# Patient Record
Sex: FEMALE | Race: WHITE | Employment: FULL TIME | URBAN - METROPOLITAN AREA
[De-identification: names, ages, dates, MRNs, and addresses within clinical notes are randomized per-mention and may not be internally consistent; named-entity substitution may affect disease eponyms.]

---

## 2017-02-10 ENCOUNTER — OFFICE VISIT (OUTPATIENT)
Dept: FAMILY MEDICINE | Facility: CLINIC | Age: 57
End: 2017-02-10
Payer: COMMERCIAL

## 2017-02-10 VITALS
DIASTOLIC BLOOD PRESSURE: 70 MMHG | RESPIRATION RATE: 16 BRPM | HEART RATE: 70 BPM | SYSTOLIC BLOOD PRESSURE: 120 MMHG | TEMPERATURE: 98.9 F

## 2017-02-10 DIAGNOSIS — M70.61 TROCHANTERIC BURSITIS OF BOTH HIPS: Primary | ICD-10-CM

## 2017-02-10 DIAGNOSIS — M70.62 TROCHANTERIC BURSITIS OF BOTH HIPS: Primary | ICD-10-CM

## 2017-02-10 DIAGNOSIS — M25.532 LEFT WRIST PAIN: ICD-10-CM

## 2017-02-10 DIAGNOSIS — L30.9 ECZEMA, UNSPECIFIED TYPE: ICD-10-CM

## 2017-02-10 PROCEDURE — 99214 OFFICE O/P EST MOD 30 MIN: CPT | Mod: 25 | Performed by: FAMILY MEDICINE

## 2017-02-10 PROCEDURE — 20605 DRAIN/INJ JOINT/BURSA W/O US: CPT | Mod: RT | Performed by: FAMILY MEDICINE

## 2017-02-10 RX ORDER — TRIAMCINOLONE ACETONIDE 40 MG/ML
40 INJECTION, SUSPENSION INTRA-ARTICULAR; INTRAMUSCULAR ONCE
Qty: 1 ML | Refills: 0 | OUTPATIENT
Start: 2017-02-10 | End: 2017-02-10

## 2017-02-10 RX ORDER — TRIAMCINOLONE ACETONIDE 1 MG/G
CREAM TOPICAL
Qty: 80 G | Refills: 0 | Status: SHIPPED | OUTPATIENT
Start: 2017-02-10 | End: 2018-06-22

## 2017-02-10 NOTE — NURSING NOTE
"Chief Complaint   Patient presents with     Musculoskeletal Problem     Derm Problem     /70 mmHg  Pulse 70  Temp(Src) 98.9  F (37.2  C) (Tympanic)  Resp 16  Wt  Estimated body mass index is 29.32 kg/(m^2) as calculated from the following:    Height as of 11/3/16: 5' 2.5\" (1.588 m).    Weight as of 4/22/14: 163 lb (73.936 kg).  bp completed using cuff size: large      Health Maintenance that is potentially due pending provider review:          "

## 2017-02-10 NOTE — MR AVS SNAPSHOT
After Visit Summary   2/10/2017    Bella Fuentes    MRN: [id number]           Patient Information     Date Of Birth          1960        Visit Information        Provider Department      2/10/2017 10:20 AM Umu Albright MD [facility]        Today's Diagnoses     Trochanteric bursitis of both hips    -  1     Eczema, unspecified type         Left wrist pain            Follow-ups after your visit        Who to contact     If you have questions or need follow up information about today's clinic visit or your schedule please contact [facility] directly at [phone].  Normal or non-critical lab and imaging results will be communicated to you by Twitterhart, letter or phone within 4 business days after the clinic has received the results. If you do not hear from us within 7 days, please contact the clinic through Twitterhart or phone. If you have a critical or abnormal lab result, we will notify you by phone as soon as possible.  Submit refill requests through [email or web address] or call your pharmacy and they will forward the refill request to us. Please allow 3 business days for your refill to be completed.          Additional Information About Your Visit        MyChart Information     [email or web address] gives you secure access to your electronic health record. If you see a primary care provider, you can also send messages to your care team and make appointments. If you have questions, please call your primary care clinic.  If you do not have a primary care provider, please call [phone] and they will assist you.        Care EveryWhere ID     This is your Care EveryWhere ID. This could be used by other organizations to access your [facility] medical records  [id number]        Your Vitals Were     Pulse Temperature Respirations             70 98.9  F (37.2  C) (Tympanic) 16          Blood Pressure from Last 3 Encounters:   02/10/17 120/70   11/03/16 145/79   10/29/16 134/83    Weight  from Last 3 Encounters:   04/22/14 163 lb (73.936 kg)   04/02/14 163 lb (73.936 kg)   05/13/11 195 lb (88.451 kg)              We Performed the Following     DRAIN/INJECT MEDIUM JOINT/BURSA     Kenalog 40 MG  []          Today's Medication Changes          These changes are accurate as of: 2/10/17 11:08 AM.  If you have any questions, ask your nurse or doctor.               Start taking these medicines.        Dose/Directions    triamcinolone 0.1 % cream   Commonly known as:  KENALOG   Used for:  Eczema, unspecified type   Started by:  Umu Albright MD        Apply sparingly to affected area three times daily as needed   Quantity:  80 g   Refills:  0       triamcinolone acetonide 40 MG/ML injection   Commonly known as:  KENALOG   Used for:  Trochanteric bursitis of both hips   Started by:  Umu Albrigth MD        Dose:  40 mg   1 mL (40 mg) by INTRA-ARTICULAR route once for 1 dose   Quantity:  1 mL   Refills:  0            Where to get your medicines      These medications were sent to [place] [address] [place] [facility] [place] [id number]     Phone:  [phone]    - triamcinolone 0.1 % cream      Some of these will need a paper prescription and others can be bought over the counter.  Ask your nurse if you have questions.     You don't need a prescription for these medications    - triamcinolone acetonide 40 MG/ML injection             Primary Care Provider Office Phone # Fax #    Umu Albright [phone]       [address] [address]        Thank you!     Thank you for choosing [facility]  for your care. Our goal is always to provide you with excellent care. Hearing back from our patients is one way we can continue to improve our services. Please take a few minutes to complete the written survey that you may receive in the mail after your visit with us. Thank you!             Your  Updated Medication List - Protect others around you: Learn how to safely use, store and throw away your medicines at www.disposemymeds.org.          This list is accurate as of: 2/10/17 11:08 AM.  Always use your most recent med list.                   Brand Name Dispense Instructions for use    EPINEPHrine 0.3 MG/0.3ML injection    EPIPEN 2-TRISTAN    1 each    Inject 0.3 mLs (0.3 mg) into the muscle once as needed for anaphylaxis       LORazepam 0.5 MG tablet    ATIVAN    20 tablet    Take 1 tablet (0.5 mg) by mouth every 8 hours as needed for anxiety       triamcinolone 0.1 % cream    KENALOG    80 g    Apply sparingly to affected area three times daily as needed       triamcinolone acetonide 40 MG/ML injection    KENALOG    1 mL    1 mL (40 mg) by INTRA-ARTICULAR route once for 1 dose

## 2017-02-10 NOTE — PROGRESS NOTES
"  SUBJECTIVE:                                                    Bella Fuentes is a 57 year old female who presents to clinic today for the following health issues:       Bilateral hip pain      Duration:  Several months    Description (location/character/radiation): bilateral month pain    Intensity:  mild, moderate    Accompanying signs and symptoms: pain, hard to sleep, left worse than right    History (similar episodes/previous evaluation): yes    Precipitating or alleviating factors: None    Therapies tried and outcome: Kenalog helped         Derm      Duration: months ??    Description (location/character/radiation): hand and feet rash    Intensity:  mild    Accompanying signs and symptoms: some bumps and peeling of skin    History (similar episodes/previous evaluation): None    Precipitating or alleviating factors: None    Therapies tried and outcome: Lotion and it's getting better.     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  (doesn't get the pain if she hyperextends the right thumb).   Pain is \"deep\"         Problem list and histories reviewed & adjusted, as indicated.  Additional history: as documented    /70 mmHg  Pulse 70  Temp(Src) 98.9  F (37.2  C) (Tympanic)  Resp 16  Wt   EXAM: GENERAL APPEARANCE: Alert, no acute distress  MS: wrist exam normal wrist appearance and range of motion, normal range of motion, Tinel's test negative  hip exam normal appearance, tenderness over " greater trochanter, range of motion normal  Skin: bilateral palms with peeling skin and scaling c/w eczema      Risks, benefits and alternatives discussed   A steroid injection was performed at right greater trochanter using 1% plain Lidocaine and 50 mg of Kenalog. This was well tolerated.    ASSESSMENT/PLAN:      ICD-10-CM    1. Trochanteric bursitis of both hips M70.61 Kenalog 40 MG  []    M70.62 triamcinolone acetonide (KENALOG) 40 MG/ML injection     DRAIN/INJECT MEDIUM JOINT/BURSA   2. Eczema, unspecified type L30.9 triamcinolone (KENALOG) 0.1 % cream   3. Left wrist pain M25.532         Offered xray of the wrist today, patient declined.       Discussed risks and benefits and alternatives to topical steroids.  Avoid prolonged use.  Discussed side effects such as thining of the skin and pigment changes if used for long periods of time.     Handout given on trochanteric bursitis.    Umu Albright M.D.

## 2017-03-28 ENCOUNTER — TELEPHONE (OUTPATIENT)
Dept: FAMILY MEDICINE | Facility: CLINIC | Age: 57
End: 2017-03-28

## 2017-03-28 DIAGNOSIS — M25.551 HIP PAIN, RIGHT: Primary | ICD-10-CM

## 2017-03-28 RX ORDER — DICLOFENAC SODIUM 75 MG/1
75 TABLET, DELAYED RELEASE ORAL 2 TIMES DAILY PRN
Qty: 30 TABLET | Refills: 0 | Status: SHIPPED | OUTPATIENT
Start: 2017-03-28 | End: 2018-06-22

## 2017-03-28 NOTE — TELEPHONE ENCOUNTER
Pt was notified of provider's advise below.   Pt verbalized understanding and had no further questions at this time.   Encounter closed.   Irlanda JACBO RN

## 2017-03-28 NOTE — TELEPHONE ENCOUNTER
Reason for call:  Patient reporting a symptom    Symptom or request: Pt had her right hip injected 02/10 and it is still causing a lot of pain.  Pt wants an Rx sent to [facility] [place]  I did tell her that Dr. Albright is not in today and she asked that another provider address her request.  I did tell her that if she is looking for a controlled pain med, she would need to be seen in clinic by another provider.      Duration (how long have symptoms been present): ongoing    Have you been treated for this before? Yes    Additional comments:     Phone Number patient can be reached at:  Cell number on file:    Telephone Information:   Mobile [phone]     Best Time:  any    Can we leave a detailed message on this number:  YES    Call taken on 3/28/2017 at 9:11 AM by Emily Garcia

## 2017-03-28 NOTE — TELEPHONE ENCOUNTER
Office visit 2/10/17   Trochanteric bursitis of both hips M70.61 Kenalog 40 MG []      M70.62 triamcinolone acetonide (KENALOG) 40 MG/ML injection       DRAIN/INJECT MEDIUM JOINT/BURSA     Pt c/o:  Pain with ambulation or sitting: no  - just when she lays on her right side.   Drove from [place] in a car - sitting for 4-5 days. (Flew to [place] last Sunday (3/19/17) and then drove home Thursday (3/23/17)  morning 8 hours a day)   Numbness or tingling in leg or toes: no  Pain radiates: no   Pain rated at: 8 out of 10   Toes are not discolored at this time.   Fevers: no     Not using tylenol or ibuprofen, ice or hot packs.   Pt was educated on the use of leg elevation, ice packs 20-30 minutes every 3-4 hrs.     Please advise   Thank you.   Irlanda JACOB RN

## 2017-03-28 NOTE — TELEPHONE ENCOUNTER
I have an anti inflammatory pending for her, enter pharmacy please.  Continue with other recommended cares and can try the medication for pain.  Will need to be seen if any further concerns.  Naima Pettit, KALEENP

## 2017-04-22 ENCOUNTER — TELEPHONE (OUTPATIENT)
Dept: FAMILY MEDICINE | Facility: CLINIC | Age: 57
End: 2017-04-22

## 2017-04-22 DIAGNOSIS — T78.40XA ALLERGIC REACTION: Primary | ICD-10-CM

## 2017-04-22 NOTE — TELEPHONE ENCOUNTER
Patient called today.    Patient is requesting medication for generic epinephrine ASAP.    Would like it sent to [facility].    Please contact patient.    Thank you.    Central Scheduling  Libby CARRASCO.

## 2017-04-24 RX ORDER — EPINEPHRINE 0.3 MG/.3ML
0.3 INJECTION SUBCUTANEOUS PRN
Qty: 0.6 ML | Refills: 1 | Status: SHIPPED | OUTPATIENT
Start: 2017-04-24 | End: 2018-05-05

## 2017-04-24 NOTE — TELEPHONE ENCOUNTER
Pt would like a generic brand refill for her epi-pen  Pen was last refilled in 2014   Pt states that she has not had to use a pen in a emergency situation for many years but would not like to be without it.   She states she uses the pen for bee stings.     EPINEPHrine (EPIPEN 2-TRISTAN) 0.3MG/0.3ML injection      Last Written Prescription Date: 5/2014  Last Fill Quantity: 1,  # refills: 1   Last Office Visit with [facility], P or [facility] prescribing provider: 2/10/2017            Script was sent, writer was advised by [facility] pharmacy on how to order.   Pt was advised.  Encounter closed.   Irlanda JACOB RN

## 2017-04-28 ENCOUNTER — TELEPHONE (OUTPATIENT)
Dept: NURSING | Facility: CLINIC | Age: 57
End: 2017-04-28

## 2017-04-28 ENCOUNTER — APPOINTMENT (OUTPATIENT)
Dept: GENERAL RADIOLOGY | Facility: CLINIC | Age: 57
End: 2017-04-28
Attending: NURSE PRACTITIONER
Payer: COMMERCIAL

## 2017-04-28 ENCOUNTER — HOSPITAL ENCOUNTER (EMERGENCY)
Facility: CLINIC | Age: 57
Discharge: HOME | End: 2017-04-28
Attending: NURSE PRACTITIONER | Admitting: NURSE PRACTITIONER
Payer: COMMERCIAL

## 2017-04-28 VITALS
DIASTOLIC BLOOD PRESSURE: 87 MMHG | OXYGEN SATURATION: 99 % | SYSTOLIC BLOOD PRESSURE: 144 MMHG | RESPIRATION RATE: 16 BRPM | TEMPERATURE: 97.7 F

## 2017-04-28 DIAGNOSIS — R06.02 SHORTNESS OF BREATH: ICD-10-CM

## 2017-04-28 LAB
ANION GAP SERPL CALCULATED.3IONS-SCNC: 8 MMOL/L (ref 3–14)
BASOPHILS # BLD AUTO: 0.1 10E9/L (ref 0–0.2)
BASOPHILS NFR BLD AUTO: 0.6 %
BUN SERPL-MCNC: 20 MG/DL (ref 7–30)
CALCIUM SERPL-MCNC: 9.1 MG/DL (ref 8.5–10.1)
CHLORIDE SERPL-SCNC: 106 MMOL/L (ref 94–109)
CO2 SERPL-SCNC: 24 MMOL/L (ref 20–32)
CREAT SERPL-MCNC: 0.68 MG/DL (ref 0.52–1.04)
DIFFERENTIAL METHOD BLD: ABNORMAL
EOSINOPHIL # BLD AUTO: 0.2 10E9/L (ref 0–0.7)
EOSINOPHIL NFR BLD AUTO: 2 %
ERYTHROCYTE [DISTWIDTH] IN BLOOD BY AUTOMATED COUNT: 14.2 % (ref 10–15)
GFR SERPL CREATININE-BSD FRML MDRD: 88 ML/MIN/1.7M2
GLUCOSE SERPL-MCNC: 89 MG/DL (ref 70–99)
HCT VFR BLD AUTO: 42.7 % (ref 35–47)
HGB BLD-MCNC: 14.2 G/DL (ref 11.7–15.7)
IMM GRANULOCYTES # BLD: 0 10E9/L (ref 0–0.4)
IMM GRANULOCYTES NFR BLD: 0.2 %
LYMPHOCYTES # BLD AUTO: 3.5 10E9/L (ref 0.8–5.3)
LYMPHOCYTES NFR BLD AUTO: 29.4 %
MCH RBC QN AUTO: 29 PG (ref 26.5–33)
MCHC RBC AUTO-ENTMCNC: 33.3 G/DL (ref 31.5–36.5)
MCV RBC AUTO: 87 FL (ref 78–100)
MONOCYTES # BLD AUTO: 0.9 10E9/L (ref 0–1.3)
MONOCYTES NFR BLD AUTO: 7.4 %
NEUTROPHILS # BLD AUTO: 7.2 10E9/L (ref 1.6–8.3)
NEUTROPHILS NFR BLD AUTO: 60.4 %
PLATELET # BLD AUTO: 300 10E9/L (ref 150–450)
POTASSIUM SERPL-SCNC: 4.1 MMOL/L (ref 3.4–5.3)
RBC # BLD AUTO: 4.9 10E12/L (ref 3.8–5.2)
SODIUM SERPL-SCNC: 138 MMOL/L (ref 133–144)
TROPONIN I SERPL-MCNC: NORMAL UG/L (ref 0–0.04)
WBC # BLD AUTO: 11.8 10E9/L (ref 4–11)

## 2017-04-28 PROCEDURE — 93005 ELECTROCARDIOGRAM TRACING: CPT

## 2017-04-28 PROCEDURE — 25000125 ZZHC RX 250: Performed by: NURSE PRACTITIONER

## 2017-04-28 PROCEDURE — 93010 ELECTROCARDIOGRAM REPORT: CPT | Performed by: NURSE PRACTITIONER

## 2017-04-28 PROCEDURE — 80048 BASIC METABOLIC PNL TOTAL CA: CPT | Performed by: NURSE PRACTITIONER

## 2017-04-28 PROCEDURE — 85025 COMPLETE CBC W/AUTO DIFF WBC: CPT | Performed by: NURSE PRACTITIONER

## 2017-04-28 PROCEDURE — 99215 OFFICE O/P EST HI 40 MIN: CPT | Mod: 25

## 2017-04-28 PROCEDURE — 99214 OFFICE O/P EST MOD 30 MIN: CPT | Mod: 25 | Performed by: NURSE PRACTITIONER

## 2017-04-28 PROCEDURE — 71020 XR CHEST 2 VW: CPT

## 2017-04-28 PROCEDURE — 94640 AIRWAY INHALATION TREATMENT: CPT

## 2017-04-28 PROCEDURE — 84484 ASSAY OF TROPONIN QUANT: CPT | Performed by: NURSE PRACTITIONER

## 2017-04-28 RX ORDER — ALBUTEROL SULFATE 90 UG/1
2 AEROSOL, METERED RESPIRATORY (INHALATION) EVERY 4 HOURS PRN
Qty: 1 INHALER | Refills: 0 | Status: SHIPPED | OUTPATIENT
Start: 2017-04-28 | End: 2018-06-22

## 2017-04-28 RX ORDER — IPRATROPIUM BROMIDE AND ALBUTEROL SULFATE 2.5; .5 MG/3ML; MG/3ML
3 SOLUTION RESPIRATORY (INHALATION) ONCE
Status: COMPLETED | OUTPATIENT
Start: 2017-04-28 | End: 2017-04-28

## 2017-04-28 RX ADMIN — IPRATROPIUM BROMIDE AND ALBUTEROL SULFATE 3 ML: .5; 3 SOLUTION RESPIRATORY (INHALATION) at 18:18

## 2017-04-28 ASSESSMENT — ENCOUNTER SYMPTOMS
SINUS PRESSURE: 1
CONSTIPATION: 0
ABDOMINAL PAIN: 0
ACTIVITY CHANGE: 0
CHEST TIGHTNESS: 1
DIARRHEA: 0
FATIGUE: 1
VOMITING: 0
CHILLS: 0
NAUSEA: 0
BLOOD IN STOOL: 0
PALPITATIONS: 0
NEUROLOGICAL NEGATIVE: 1
COUGH: 0
MUSCULOSKELETAL NEGATIVE: 1
FEVER: 0
SHORTNESS OF BREATH: 1

## 2017-04-28 NOTE — ED NOTES
SOA over the last several days, worse today, has had fatigue for last couple weeks, upper back pain today relieved with heat, denies cough or other symptoms

## 2017-04-28 NOTE — ED NOTES
Nebulizer given, patient had a hard time doing it with much instruction, kept appearing to gag so could not keep in mouth, did better with shallow breaths

## 2017-04-28 NOTE — TELEPHONE ENCOUNTER
Call Type: Triage Call    Presenting Problem: Bella is calling to schedule an appointment with  clinic as bursitis is getting worse.   Bella refused triage.  Triage Note:  Guideline Title: No Guideline - Advice Per Reference (Adult)  Recommended Disposition: See Provider within 24 hours  Original Inclination: Wanted to speak with a nurse  Override Disposition:  Intended Action: Call PCP/HCP  Physician Contacted: No  SEE PROVIDER WITHIN 24 HOURS ?  YES  SEE ED IMMEDIATELY ? NO  CALL POISON CENTER IMMEDIATELY ? NO  CALL LOCAL AGENCY IMMEDIATELY ? NO  SEE DENTIST WITHIN 4 HOURS ? NO  ACTIVATE  ? NO  SEE PROVIDER WITHIN 4 HOURS ? NO  CALL PROVIDER IMMEDIATELY ? NO  Physician Instructions:  Care Advice:

## 2017-04-28 NOTE — ED AVS SNAPSHOT
[facility] Emergency Department    [address] [address]    Phone:  [phone]    Fax:  [phone]                                       Bella Fuentes   MRN: [id number]    Department:  [facility] Emergency Department   Date of Visit:  4/28/2017           After Visit Summary Signature Page     I have received my discharge instructions, and my questions have been answered. I have discussed any challenges I see with this plan with the nurse or doctor.    ..........................................................................................................................................  Patient/Patient Representative Signature      ..........................................................................................................................................  Patient Representative Print Name and Relationship to Patient    ..................................................               ................................................  Date                                            Time    ..........................................................................................................................................  Reviewed by Signature/Title    ...................................................              ..............................................  Date                                                            Time

## 2017-04-28 NOTE — ED AVS SNAPSHOT
[facility] Emergency Department    [address]    [facility] [address]    Phone:  [phone]    Fax:  [phone]                                       Bella Fuentes   MRN: [id number]    Department:  [facility] Emergency Department   Date of Visit:  4/28/2017           Patient Information     Date Of Birth          1960        Your diagnoses for this visit were:     Shortness of breath        You were seen by Katalina Taveras APRN CNP.      Follow-up Information     Follow up with Umu Albright MD. Schedule an appointment as soon as possible for a visit in 3 days.    Specialty:  Family Practice    Contact information:    [facility]  [address]  [facility] [address]  [phone]          Discharge Instructions       Unclear cause for your symptoms.  EKG normal.  Labs normal.   Chest xray normal.   If symptoms persist recommend follow-up with your regular doctor for recheck in clinic early next week.    Return to the emergency department immediately if you develop chest pain, increased shortness of breath, fever or worse in any way.    Future Appointments        Provider Department Dept Phone Center    5/1/2017 5:20 PM CHARANJIT Canales MD [facility] [phone] [facility]      24 Hour Appointment Hotline       To make an appointment at any [facility], call [phone] ([phone]). If you don't have a family doctor or clinic, we will help you find one. [facility] are conveniently located to serve the needs of you and your family.             Review of your medicines      START taking        Dose / Directions Last dose taken    albuterol 108 (90 BASE) MCG/ACT Inhaler   Commonly known as:  albuterol   Dose:  2 puff   Quantity:  1 Inhaler        Inhale 2 puffs into the lungs every 4 hours as needed for shortness of breath / dyspnea   Refills:  0          Our records show that you are taking the medicines listed below. If these are incorrect,  "please call your family doctor or clinic.        Dose / Directions Last dose taken    diclofenac 75 MG EC tablet   Commonly known as:  VOLTAREN   Dose:  75 mg   Quantity:  30 tablet        Take 1 tablet (75 mg) by mouth 2 times daily as needed for moderate pain   Refills:  0        * EPINEPHrine 0.3 MG/0.3ML injection   Commonly known as:  EPIPEN 2-TRISTAN   Dose:  0.3 mg   Quantity:  1 each        Inject 0.3 mLs (0.3 mg) into the muscle once as needed for anaphylaxis   Refills:  1        * EPINEPHrine 0.3 MG/0.3ML injection   Dose:  0.3 mg   Quantity:  0.6 mL        Inject 0.3 mLs (0.3 mg) into the muscle as needed for anaphylaxis (Pt is requesting the generic brand - does not want \"epi-pen\" brand)   Refills:  1        LORazepam 0.5 MG tablet   Commonly known as:  ATIVAN   Dose:  0.5 mg   Quantity:  20 tablet        Take 1 tablet (0.5 mg) by mouth every 8 hours as needed for anxiety   Refills:  0        triamcinolone 0.1 % cream   Commonly known as:  KENALOG   Quantity:  80 g        Apply sparingly to affected area three times daily as needed   Refills:  0        * Notice:  This list has 2 medication(s) that are the same as other medications prescribed for you. Read the directions carefully, and ask your doctor or other care provider to review them with you.            Prescriptions were sent or printed at these locations (1 Prescription)                   [facility] [address] [address]    Telephone:  [phone]   Fax:  [phone]   Hours:                  E-Prescribed (1 of 1)         albuterol (ALBUTEROL) 108 (90 BASE) MCG/ACT Inhaler                Procedures and tests performed during your visit     Basic metabolic panel    CBC with platelets differential    EKG 12-lead, tracing only    Troponin I    XR Chest 2 Views      Orders Needing Specimen Collection     None      Pending Results     Date and Time Order Name Status Description    " 4/28/2017 1736 XR Chest 2 Views Preliminary             Pending Culture Results     No orders found from 4/26/2017 to 4/29/2017.            Test Results From Your Hospital Stay        4/28/2017  6:50 PM      Narrative     CHEST TWO VIEWS    4/28/2017 6:35 PM     HISTORY: Short of breath    COMPARISON: None.    FINDINGS: The heart size is normal. Lungs are clear. Mild midthoracic  curve concave left.        Impression     IMPRESSION: Negative.         4/28/2017  6:25 PM      Component Results     Component Value Ref Range & Units Status    WBC 11.8 (H) 4.0 - 11.0 10e9/L Final    RBC Count 4.90 3.8 - 5.2 10e12/L Final    Hemoglobin 14.2 11.7 - 15.7 g/dL Final    Hematocrit 42.7 35.0 - 47.0 % Final    MCV 87 78 - 100 fl Final    MCH 29.0 26.5 - 33.0 pg Final    MCHC 33.3 31.5 - 36.5 g/dL Final    RDW 14.2 10.0 - 15.0 % Final    Platelet Count 300 150 - 450 10e9/L Final    Diff Method Automated Method  Final    % Neutrophils 60.4 % Final    % Lymphocytes 29.4 % Final    % Monocytes 7.4 % Final    % Eosinophils 2.0 % Final    % Basophils 0.6 % Final    % Immature Granulocytes 0.2 % Final    Absolute Neutrophil 7.2 1.6 - 8.3 10e9/L Final    Absolute Lymphocytes 3.5 0.8 - 5.3 10e9/L Final    Absolute Monocytes 0.9 0.0 - 1.3 10e9/L Final    Absolute Eosinophils 0.2 0.0 - 0.7 10e9/L Final    Absolute Basophils 0.1 0.0 - 0.2 10e9/L Final    Abs Immature Granulocytes 0.0 0 - 0.4 10e9/L Final         4/28/2017  6:52 PM      Component Results     Component Value Ref Range & Units Status    Sodium 138 133 - 144 mmol/L Final    Potassium 4.1 3.4 - 5.3 mmol/L Final    Chloride 106 94 - 109 mmol/L Final    Carbon Dioxide 24 20 - 32 mmol/L Final    Anion Gap 8 3 - 14 mmol/L Final    Glucose 89 70 - 99 mg/dL Final    Urea Nitrogen 20 7 - 30 mg/dL Final    Creatinine 0.68 0.52 - 1.04 mg/dL Final    GFR Estimate 88 >60 mL/min/1.7m2 Final    Non  GFR Calc    GFR Estimate If Black >90   GFR Calc   >60  mL/min/1.7m2 Final    Calcium 9.1 8.5 - 10.1 mg/dL Final         4/28/2017  6:52 PM      Component Results     Component Value Ref Range & Units Status    Troponin I ES  0.000 - 0.045 ug/L Final    <0.015  The 99th percentile for upper reference range is 0.045 ug/L.  Troponin values in   the range of 0.045 - 0.120 ug/L may be associated with risks of adverse   clinical events.                  Thank you for choosing [facility]       Thank you for choosing [facility] for your care. Our goal is always to provide you with excellent care. Hearing back from our patients is one way we can continue to improve our services. Please take a few minutes to complete the written survey that you may receive in the mail after you visit with us. Thank you!        ClearContexthart Information     [employer] gives you secure access to your electronic health record. If you see a primary care provider, you can also send messages to your care team and make appointments. If you have questions, please call your primary care clinic.  If you do not have a primary care provider, please call [phone] and they will assist you.        Care EveryWhere ID     This is your Care EveryWhere ID. This could be used by other organizations to access your [facility] medical records  [id number]        After Visit Summary       This is your record. Keep this with you and show to your community pharmacist(s) and doctor(s) at your next visit.

## 2017-04-28 NOTE — ED NOTES
Pt states she gets tired just trying to talk, feels SOB and fatigue all the time, is up set she wasn't able to go to [facility] but understands now after talking with her why she needs to be seen in ER

## 2017-04-28 NOTE — TELEPHONE ENCOUNTER
Call Type: Triage Call    Presenting Problem: FNA phoned Bella back to triage as she scheduled  for chest pain and shortness of breath.  Bella is having breathing  issues and chest discomfort.  No chest pain currently.  [facility] (formerly [facility]3]  Triage/Chest Pain/disposition is to be seen within 72 hours and  Bella agreed.  Triage Note:  Guideline Title: Chest Pain  Recommended Disposition: See Provider within 72 Hours  Original Inclination: Wanted to speak with a nurse  Override Disposition:  Intended Action: Call PCP/HCP  Physician Contacted: No  Pain brought on by, or made worse by, pressure on a localized area (such as rib)  AND not previously evaluated ?  YES  Chest pain occurs only with swallowing ? NO  Symptoms worse when lying down AND better when sitting and leaning forward ? NO  Loss of consciousness for any period of time ? NO  New or worsening signs and symptoms that may indicate shock ? NO  Cardiac symptoms now or within last hour began after cocaine or methamphetamine  use ? NO  Current severe chest pain following chest injury in prior 48 hours ? NO  Currently having unexplained profound weakness or dizziness ? NO  Burning or tingling feeling, itchiness or stabbing pain in a localized area on one  side of body followed by reddened fluid-filled blisters that break and crust ? NO  Moderate to severe pain occurring with deep breath or a productive cough for one  full day or more ? NO  Previously diagnosed angina AND symptoms have increased in frequency or severity ?  NO  Chest discomfort associated with shortness of breath, sweating, odd heartbeats or  different heart rate, nausea, vomiting, lightheadedness, or fainting lasting 5 or  more minutes now or within the last hour ? NO  Pain brought on by, or made worse by, movement or change of position (such as  pushing down on arms of chair to get up) AND not previously evaluated ? NO  Cardiac symptoms within last 24 hours AND known coronary artery disease (history  of  myocardial infarction (MI), angina, percutaneous coronary intervention (PCI)  or cardiac surgery) ? NO  Chest pain spreading to the shoulders, neck, jaw, in one or both arms, stomach or  back lasting 5 or more minutes now or within the last hour. Pain is NOT  associated with taking a deep breath or a productive cough, movement, or touch to  a localized area. ? NO  Previously diagnosed angina AND symptoms not relieved by provider defined  treatment regime OR symptoms were relieved and returned ? NO  One or more occurrences of unexplained pain in shoulders, neck, jaw, in one or  both arms, stomach or back lasting more than a few minutes that has not been  evaluated by a healthcare provider and has risk factors for cardiovascular  disease. ? NO  Pressure, fullness, squeezing sensation or pain anywhere in the chest lasting 5 or  more minutes now or within the last hour. Pain is NOT associated with taking a  deep breath or a productive cough, movement, or touch to a localized area on the  chest. ? NO  Recurring episodes of hiccups or an episode that can't be stopped (more than 24  hours) ? NO  Sudden onset of shortness of breath, chest pain and cough with blood tinged sputum  ? NO  Chest pain now or within last hour AND known coronary artery disease (history of  myocardial infarction (MI), angina, percutaneous coronary intervention (PCI) or  cardiac surgery) ? NO  New onset or worsening shortness of breath or difficulty breathing ? NO  Physician Instructions:  Care Advice: SYMPTOM / CONDITION MANAGEMENT  Analgesic/Antipyretic Advice - Acetaminophen: Consider acetaminophen as  directed on label or by pharmacist/provider for pain or fever. PRECAUTIONS:  - Use if there is no history of liver disease, alcoholism, or intake of  three or more alcohol drinks per day - Only if approved by provider during  pregnancy or when breastfeeding - Do not exceed recommended dose or  frequency. Do not take more than 3000 milligrams (mg) in  24 hours. Do not  take this medicine for more than 10 days unless recommended by your  provider. - During pregnancy, acetaminophen should not be taken more than 3  consecutive days without telling provider - To make sure you don't take too  much, check other medicines you take to see if they also contain  acetaminophen.

## 2017-04-28 NOTE — DISCHARGE INSTRUCTIONS
Unclear cause for your symptoms.  EKG normal.  Labs normal.   Chest xray normal.   If symptoms persist recommend follow-up with your regular doctor for recheck in clinic early next week.    Return to the emergency department immediately if you develop chest pain, increased shortness of breath, fever or worse in any way.

## 2017-04-28 NOTE — ED PROVIDER NOTES
"  History     Chief Complaint   Patient presents with     Shortness of Breath     Fatigue     HAS BEEN GOING ON FOR A FEW WEEKS,      HPI  Bella Fuentes is a 57 year old female with history of hyperlipidemia who presents to the emergency department for evaluation of shortness of breath and fatigue.  Symptoms x 2-3 weeks. Feels like she is not getting a deep breath at times. Describes intermittent sinus congestion.  Denies cough. Denies fever/chills. Denies chest pain. She has no history of CAD, does not take any medications, no hormone replacement. Of note, she has new chicks in her basement that arrived in the last couple weeks and she has to clean/change the bedding every other day. She is a former smoker, quit in January. Exposed to second hand smoke by spouse. Very occasional ETOH, no regular use. Family history of CAD in both parents.     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   Current Outpatient Prescriptions on File Prior to Encounter:  EPINEPHrine 0.3 MG/0.3ML injection Inject 0.3 mLs (0.3 mg) into the muscle as needed for anaphylaxis (Pt is requesting the generic brand - does not want \"epi-pen\" brand)   diclofenac (VOLTAREN) 75 MG EC tablet Take 1 tablet (75 mg) by mouth 2 times daily as needed for moderate pain   triamcinolone (KENALOG) 0.1 % cream Apply sparingly to affected area three times daily as needed   LORazepam (ATIVAN) 0.5 MG tablet Take 1 tablet (0.5 mg) by mouth every 8 hours as needed for anxiety   EPINEPHrine (EPIPEN 2-TRISTAN) 0.3 MG/0.3ML injection Inject 0.3 mLs (0.3 mg) into the muscle once as needed " for anaphylaxis       I have reviewed the Medications, Allergies, Past Medical and Surgical History, and Social History in the Epic system.    Review of Systems   Constitutional: Positive for fatigue. Negative for activity change, chills and fever.   HENT: Positive for sinus pressure.    Respiratory: Positive for chest tightness and shortness of breath. Negative for cough.    Cardiovascular: Negative for chest pain, palpitations and leg swelling.   Gastrointestinal: Negative for abdominal pain, blood in stool, constipation, diarrhea, nausea and vomiting.   Genitourinary: Negative.    Musculoskeletal: Negative.    Skin: Negative.    Neurological: Negative.        Physical Exam   BP: 144/87  Heart Rate: 80  Temp: 97.7  F (36.5  C)  Resp: 16  SpO2: 99 %  Physical Exam   Constitutional: She is oriented to person, place, and time. She appears well-developed and well-nourished. No distress.   HENT:   Head: Normocephalic and atraumatic.   Right Ear: External ear normal.   Left Ear: External ear normal.   Nose: Nose normal.   Mouth/Throat: Oropharynx is clear and moist.   Eyes: Conjunctivae and EOM are normal.   Neck: Normal range of motion. Neck supple.   Cardiovascular: Normal rate, regular rhythm and normal heart sounds.    Pulmonary/Chest: Effort normal and breath sounds normal.   Abdominal: Soft.   Musculoskeletal: Normal range of motion.   Neurological: She is alert and oriented to person, place, and time.   Skin: Skin is warm and dry.       ED Course     ED Course     Procedures             EKG Interpretation:      Interpreted by Dr. Santy Cameron  Time reviewed: 6:04pm  Symptoms at time of EKG: shortness of breath   Rhythm: normal sinus   Rate: normal  Axis: normal  Ectopy: none  Conduction: normal  ST Segments/ T Waves: No ST-T wave changes  Q Waves: none  Comparison to prior: No old EKG available    Clinical Impression: normal EKG    WELLS PE SCORE for Pulmonary Embolism likelihood  (calculator)  Background  Calculates likelihood of PE based on 7 criteria including suspected DVT, HR>100, recent surgery or immobilization, prior VTE, hemoptysis, active cancer.  Data  has Need for prophylactic hormone replacement therapy (postmenopausal); Allergic reaction; HYPERLIPIDEMIA LDL GOAL <160; Localized osteoarthrosis, lower leg; Abnormal gait; and Pain in joint, pelvic region and thigh on her problem list.   has a past surgical history that includes Colonoscopy (6/18/2014).     Criteria   Of possible 12.5 points for 7 possible items:  NEGATIVE  Interpretation  Wells PE Score: 0  Score 0-2 points: Low PE probability (3.6% risk)    Results for orders placed or performed during the hospital encounter of 04/28/17 (from the past 24 hour(s))   CBC with platelets differential   Result Value Ref Range    WBC 11.8 (H) 4.0 - 11.0 10e9/L    RBC Count 4.90 3.8 - 5.2 10e12/L    Hemoglobin 14.2 11.7 - 15.7 g/dL    Hematocrit 42.7 35.0 - 47.0 %    MCV 87 78 - 100 fl    MCH 29.0 26.5 - 33.0 pg    MCHC 33.3 31.5 - 36.5 g/dL    RDW 14.2 10.0 - 15.0 %    Platelet Count 300 150 - 450 10e9/L    Diff Method Automated Method     % Neutrophils 60.4 %    % Lymphocytes 29.4 %    % Monocytes 7.4 %    % Eosinophils 2.0 %    % Basophils 0.6 %    % Immature Granulocytes 0.2 %    Absolute Neutrophil 7.2 1.6 - 8.3 10e9/L    Absolute Lymphocytes 3.5 0.8 - 5.3 10e9/L    Absolute Monocytes 0.9 0.0 - 1.3 10e9/L    Absolute Eosinophils 0.2 0.0 - 0.7 10e9/L    Absolute Basophils 0.1 0.0 - 0.2 10e9/L    Abs Immature Granulocytes 0.0 0 - 0.4 10e9/L   Basic metabolic panel   Result Value Ref Range    Sodium 138 133 - 144 mmol/L    Potassium 4.1 3.4 - 5.3 mmol/L    Chloride 106 94 - 109 mmol/L    Carbon Dioxide 24 20 - 32 mmol/L    Anion Gap 8 3 - 14 mmol/L    Glucose 89 70 - 99 mg/dL    Urea Nitrogen 20 7 - 30 mg/dL    Creatinine 0.68 0.52 - 1.04 mg/dL    GFR Estimate 88 >60 mL/min/1.7m2    GFR Estimate If Black >90   GFR  Calc   >60 mL/min/1.7m2    Calcium 9.1 8.5 - 10.1 mg/dL   Troponin I   Result Value Ref Range    Troponin I ES  0.000 - 0.045 ug/L     <0.015  The 99th percentile for upper reference range is 0.045 ug/L.  Troponin values in   the range of 0.045 - 0.120 ug/L may be associated with risks of adverse   clinical events.     XR Chest 2 Views    Narrative    CHEST TWO VIEWS    4/28/2017 6:35 PM     HISTORY: Short of breath    COMPARISON: None.    FINDINGS: The heart size is normal. Lungs are clear. Mild midthoracic  curve concave left.      Impression    IMPRESSION: Negative.         Assessments & Plan (with Medical Decision Making)   DDx: MI, PE, pneumonia, RAD, bronchitis, pneumothorax, anemia,     Bella ESTHELA Fuentes is a 57 year old female with history of hyperlipidemia who presents to the emergency department for evaluation of shortness of breath and fatigue.  Symptoms x 2-3 weeks. Feels like she is not getting a deep breath at times. Describes intermittent sinus congestion.  Denies cough. Denies fever/chills. Denies chest pain. She has no history of CAD, does not take any medications, no hormone replacement. Of note, she has new chicks in her basement that arrived in the last couple weeks and she has to clean/change the bedding every other day. She is a former smoker, quit in January. Exposed to second hand smoke by spouse. Very occasional ETOH, no regular use. Family history of CAD in both parents.  VSS. Afebrile. EKG is normal. CBC normal. CMP normal. Troponin normal.  Chest xray is normal. Unclear etiology for her shortness of breath.  I have low suspicion for cardiac cause given her negative Troponin and normal EKG, no active chest pain and no history of previous cardiac event. No anemia. No increased risk/suspicion for PE given she has no tachycardia, no recent surgery, no recent malignancy, no LE swelling, no hormone replacement therapy, and no immobilization.  Wells PE Likelihood Score is 0 (3.6% risk). Given her  intermittent sinus congestion I am suspicious for a reactive airway component.  She received an DuoNeb treatment while here in the emergency Department with slight improvement of her symptoms.  Patient will be discharged home.  Reassurance given.  Prescription for albuterol inhaler sent to the pharmacy. Instructed to follow-up with her regular doctor for persistent symptoms and she has an appointment on Monday.  Instructed to return to the emergency department for increased shortness of breath, chest pain, fever, or worse in any way.        I have reviewed the nursing notes.    I have reviewed the findings, diagnosis, plan and need for follow up with the patient.    New Prescriptions    No medications on file       Final diagnoses:   Shortness of breath       4/28/2017   [facility] EMERGENCY DEPARTMENT     Katalina Taveras, YOANA BROTHERS  04/28/17 [id number]

## 2017-04-29 NOTE — ED NOTES
Patient having no problems with respirations at this time  Patient  Given Optihaler to use with her inhaler

## 2017-05-01 ENCOUNTER — OFFICE VISIT (OUTPATIENT)
Dept: FAMILY MEDICINE | Facility: CLINIC | Age: 57
End: 2017-05-01
Payer: COMMERCIAL

## 2017-05-01 VITALS — SYSTOLIC BLOOD PRESSURE: 118 MMHG | DIASTOLIC BLOOD PRESSURE: 76 MMHG | HEART RATE: 72 BPM

## 2017-05-01 DIAGNOSIS — M70.61 TROCHANTERIC BURSITIS OF RIGHT HIP: ICD-10-CM

## 2017-05-01 DIAGNOSIS — B97.89 VIRAL LARYNGITIS: Primary | ICD-10-CM

## 2017-05-01 DIAGNOSIS — J04.0 VIRAL LARYNGITIS: Primary | ICD-10-CM

## 2017-05-01 PROCEDURE — 99214 OFFICE O/P EST MOD 30 MIN: CPT | Performed by: FAMILY MEDICINE

## 2017-05-01 NOTE — MR AVS SNAPSHOT
After Visit Summary   5/1/2017    Bella Fuentes    MRN: [id number]           Patient Information     Date Of Birth          1960        Visit Information        Provider Department      5/1/2017 5:20 PM Parker, CHARANJIT Orellana MD [facility]        Today's Diagnoses     Viral laryngitis    -  1    Trochanteric bursitis of right hip          Care Instructions    Push fluids, rest your voice as much as possible.     We could inject the bursa again if it gets severe.         Follow-ups after your visit        Who to contact     If you have questions or need follow up information about today's clinic visit or your schedule please contact [facility] directly at [phone].  Normal or non-critical lab and imaging results will be communicated to you by MyChart, letter or phone within 4 business days after the clinic has received the results. If you do not hear from us within 7 days, please contact the clinic through [employer] or phone. If you have a critical or abnormal lab result, we will notify you by phone as soon as possible.  Submit refill requests through [employer] or call your pharmacy and they will forward the refill request to us. Please allow 3 business days for your refill to be completed.          Additional Information About Your Visit        MyChart Information     [employer] gives you secure access to your electronic health record. If you see a primary care provider, you can also send messages to your care team and make appointments. If you have questions, please call your primary care clinic.  If you do not have a primary care provider, please call [phone] and they will assist you.        Care EveryWhere ID     This is your Care EveryWhere ID. This could be used by other organizations to access your [facility] medical records  [id number]        Your Vitals Were     Pulse                   72            Blood Pressure from Last 3 Encounters:   05/01/17 118/76  "  04/28/17 144/87   02/10/17 120/70    Weight from Last 3 Encounters:   04/22/14 163 lb (73.9 kg)   04/02/14 163 lb (73.9 kg)   05/13/11 195 lb (88.5 kg)              Today, you had the following     No orders found for display       Primary Care Provider Office Phone # Fax #    Umu Albright [phone]       [facility] [address] [address] [facility] [id number]        Thank you!     Thank you for choosing [facility]  for your care. Our goal is always to provide you with excellent care. Hearing back from our patients is one way we can continue to improve our services. Please take a few minutes to complete the written survey that you may receive in the mail after your visit with us. Thank you!             Your Updated Medication List - Protect others around you: Learn how to safely use, store and throw away your medicines at www.disposemymeds.org.          This list is accurate as of: 5/1/17  5:53 PM.  Always use your most recent med list.                   Brand Name Dispense Instructions for use    albuterol 108 (90 BASE) MCG/ACT Inhaler    albuterol    1 Inhaler    Inhale 2 puffs into the lungs every 4 hours as needed for shortness of breath / dyspnea       diclofenac 75 MG EC tablet    VOLTAREN    30 tablet    Take 1 tablet (75 mg) by mouth 2 times daily as needed for moderate pain       * EPINEPHrine 0.3 MG/0.3ML injection    EPIPEN 2-TRISTAN    1 each    Inject 0.3 mLs (0.3 mg) into the muscle once as needed for anaphylaxis       * EPINEPHrine 0.3 MG/0.3ML injection     0.6 mL    Inject 0.3 mLs (0.3 mg) into the muscle as needed for anaphylaxis (Pt is requesting the generic brand - does not want \"epi-pen\" brand)       LORazepam 0.5 MG tablet    ATIVAN    20 tablet    Take 1 tablet (0.5 mg) by mouth every 8 hours as needed for anxiety       triamcinolone 0.1 % cream    KENALOG    80 g    Apply sparingly to affected area three times daily as needed       * Notice:  This list " has 2 medication(s) that are the same as other medications prescribed for you. Read the directions carefully, and ask your doctor or other care provider to review them with you.

## 2017-05-01 NOTE — PATIENT INSTRUCTIONS
Push fluids, rest your voice as much as possible.     We could inject the bursa again if it gets severe.

## 2017-05-01 NOTE — PROGRESS NOTES
SUBJECTIVE:                                                    Bella Fuentes is a 57 year old female who presents to clinic today for the following health issues:      ED/UC Followup:    Facility:  [facility]  Date of visit: 4/28/2017  Reason for visit: SOA   Current Status: Somewhat improved      Please see the emergency room report for details. They could not find a definite cause for her shortness of breath, but felt she may have a degree of bronchospasm. She improved a little bit with an albuterol neb so they sent her home with an albuterol inhaler. She has been using this but hasn't really noticed a significant improvement. The day following the emergency room visit was a Saturday and she went back to bed after breakfast and slept for almost 6 hours which is extremely unusual for her. Off-and-on over the weekend she seemed to lose her voice but now it has come back this evening.    PROBLEMS TO ADD ON...  Right hip pain: She has been diagnosed with trochanteric bursitis in the past and had a good response to a steroid injection the first time it was done, less of her response with the second injection. She continues to have some pain in the same area but it's not too severe at this time. She just had questions about how long this was going to be an issue for    Problem list and histories reviewed & adjusted, as indicated.  Additional history: none        Reviewed and updated as needed this visit by clinical staff  Allergies  Meds       Reviewed and updated as needed this visit by Provider               ROS:  Constitutional, HEENT, cardiovascular, pulmonary, gi and gu systems are negative, except as otherwise noted.        OBJECTIVE:                                                    /76 (BP Location: Right arm, Patient Position: Chair, Cuff Size: Adult Regular)  Pulse 72    GENERAL: healthy, alert and no distress  EYES: Eyes grossly normal to inspection, extraocular movements - intact, and PERRL  HENT: ear  canals- normal; TMs- normal; Nose- normal; Mouth- no ulcers, no lesions  NECK: no tenderness, no adenopathy, no asymmetry, no masses, no stiffness; thyroid- normal to palpation  RESP: lungs clear to auscultation - no rales, no rhonchi, no wheezes  CV: regular rates and rhythm, normal S1 S2, no S3 or S4 and no murmur, no click or rub -  MS: extremities- no gross deformities noted, no edema         ASSESSMENT/PLAN:                                                    ASSESSMENT:  1. Viral laryngitis    2. Trochanteric bursitis of right hip        PLAN:  Looking back on it her symptoms fit most consistently with a viral laryngitis that has caused a mild shortness of breath, voice loss and fatigue. Patient was reassured that this should resolve      Patient Instructions   Push fluids, rest your voice as much as possible.     We could inject the bursa again if it gets severe.       CHARANJIT Canales  [facility]

## 2017-05-01 NOTE — NURSING NOTE
"Chief Complaint   Patient presents with     Patient Request     concerns with bursitis right hip X ongoing for 2 years     Patient Request     SOA X 4-5 days.and fatigue X 1 - 2  weeks pt. was seen in ER on 4/28/2017  follow up today.        Initial /76 (BP Location: Right arm, Patient Position: Chair, Cuff Size: Adult Regular)  Pulse 72 Estimated body mass index is 30.55 kg/(m^2) as calculated from the following:    Height as of 4/22/14: 5' 1.25\" (1.556 m).    Weight as of 4/22/14: 163 lb (73.9 kg).  Medication Reconciliation: complete     Annalee Dhillon, CMA      "

## 2017-07-15 ENCOUNTER — HEALTH MAINTENANCE LETTER (OUTPATIENT)
Age: 57
End: 2017-07-15

## 2018-05-05 DIAGNOSIS — T78.40XA ALLERGIC REACTION: ICD-10-CM

## 2018-05-07 RX ORDER — EPINEPHRINE 0.3 MG/.3ML
0.3 INJECTION SUBCUTANEOUS PRN
Qty: 0.6 ML | Refills: 0 | Status: SHIPPED | OUTPATIENT
Start: 2018-05-07 | End: 2019-05-15

## 2018-05-07 NOTE — TELEPHONE ENCOUNTER
"Requested Prescriptions   Pending Prescriptions Disp Refills     EPINEPHrine (EPIPEN/ADRENACLICK/OR ANY BX GENERIC EQUIV) 0.3 MG/0.3ML injection 2-pack [Pharmacy Med Name: EPINEPHRINE 0.3 MG AUTO-INJECT]  Last Written Prescription Date:  04/24/2017  Last Fill Quantity: 0.6,  # refills: 1   Last office visit: 5/1/2017 with prescribing provider:  post   Future Office Visit:      0     Sig: INJECT INTO THE MUSCLE AS NEEDED FOR ANAPHYLAXIS    Anaphylaxis Kits Protocol Failed    5/5/2018 11:12 AM       Failed - Recent (12 mo) or future (30 days) visit witin the authorizing provider's specialty    Patient had office visit in the last 12 months or has a visit in the next 30 days with authorizing provider or within the authorizing provider's specialty.  See \"Patient Info\" tab in inbasket, or \"Choose Columns\" in Meds & Orders section of the refill encounter.           Passed - Patient is age 5 or older        Robb CARBAJAL (R)    "

## 2018-06-22 ENCOUNTER — OFFICE VISIT (OUTPATIENT)
Dept: FAMILY MEDICINE | Facility: CLINIC | Age: 58
End: 2018-06-22
Payer: COMMERCIAL

## 2018-06-22 VITALS — DIASTOLIC BLOOD PRESSURE: 74 MMHG | SYSTOLIC BLOOD PRESSURE: 102 MMHG | RESPIRATION RATE: 18 BRPM

## 2018-06-22 DIAGNOSIS — R20.9 DISTURBANCE OF SKIN SENSATION: ICD-10-CM

## 2018-06-22 DIAGNOSIS — E78.5 HYPERLIPIDEMIA LDL GOAL <160: Primary | ICD-10-CM

## 2018-06-22 LAB
CHOLEST SERPL-MCNC: 236 MG/DL
HBA1C MFR BLD: 5.4 % (ref 0–5.6)
HDLC SERPL-MCNC: 48 MG/DL
LDLC SERPL CALC-MCNC: 158 MG/DL
NONHDLC SERPL-MCNC: 188 MG/DL
TRIGL SERPL-MCNC: 148 MG/DL
TSH SERPL DL<=0.005 MIU/L-ACNC: 1.5 MU/L (ref 0.4–4)
VIT B12 SERPL-MCNC: 403 PG/ML (ref 193–986)

## 2018-06-22 PROCEDURE — 83036 HEMOGLOBIN GLYCOSYLATED A1C: CPT | Performed by: FAMILY MEDICINE

## 2018-06-22 PROCEDURE — 99213 OFFICE O/P EST LOW 20 MIN: CPT | Performed by: FAMILY MEDICINE

## 2018-06-22 PROCEDURE — 82607 VITAMIN B-12: CPT | Performed by: FAMILY MEDICINE

## 2018-06-22 PROCEDURE — 84443 ASSAY THYROID STIM HORMONE: CPT | Performed by: FAMILY MEDICINE

## 2018-06-22 PROCEDURE — 80061 LIPID PANEL: CPT | Performed by: FAMILY MEDICINE

## 2018-06-22 PROCEDURE — 36415 COLL VENOUS BLD VENIPUNCTURE: CPT | Performed by: FAMILY MEDICINE

## 2018-06-22 ASSESSMENT — PAIN SCALES - GENERAL: PAINLEVEL: NO PAIN (0)

## 2018-06-22 NOTE — PROGRESS NOTES
.  SUBJECTIVE:   Bella Fuentes is a 58 year old female who presents to clinic today for the following health issues:      Chief Complaint   Patient presents with     Numbness     Patient has noticed the last couple of months left toe numbness that will come and go. Patient is concerned regarding diabetes. Patient has no family history of diabetes.      Patient was checked in 30 minutes late for her appointment because she wanted to read the patient bill of rights.     The numbness of left great toe that comes and goes.  It's present in the morning but will resolve later in the day, sometimes will come back.     Worried about diabetes.      Stopped putting sugar in her coffee.     /74  Resp 18  Breastfeeding? No  EXAM: GENERAL APPEARANCE ADULT: Alert, no acute distress  SKIN: no suspicious lesions or rashes, no callouses, lesions of the feet  NEURO: Alert, oriented, speech and mentation normal, Cranial nerves 2-12 are normal., Strength normal and symmetrical in upper and lower extremities., Reflexes 2+ and symmetrical at biceps, triceps, brachioradialis, knees and ankles, sensation altered - left great toe, plantar aspect.   Proprioception is normal.     ASSESSMENT/PLAN:      ICD-10-CM    1. Hyperlipidemia LDL goal <160 E78.5 Lipid panel reflex to direct LDL Fasting   2. Disturbance of skin sensation R20.9 Hemoglobin A1c     TSH with free T4 reflex     Vitamin B12     Etiology uncertain, but appears to be limited to skin.  Proprioception is normal.   Options for further evaluation: neuro consult, EMG, etc.     Umu Albright M.D.      Patient Instructions         Thank you for choosing [facility].  You may be receiving a survey in the mail from [facility] regarding your visit today.  Please take a few minutes to complete and return the survey to let us know how we are doing.      Our Clinic hours are:  Mondays    7:20 am - 7 pm  Tues -  Fri  7:20 am - 5 pm    Clinic Phone: [phone]    The clinic  lab opens at 7:30 am Mon - Fri and appointments are required.    [facility]  Ph. [phone]  Monday  8 am - 7pm  Tues - Fri 8 am - 5:30 pm

## 2018-06-22 NOTE — PATIENT INSTRUCTIONS
Thank you for choosing [facility].  You may be receiving a survey in the mail from [facility] regarding your visit today.  Please take a few minutes to complete and return the survey to let us know how we are doing.      Our Clinic hours are:  Mondays    7:20 am - 7 pm  Tues - Fri  7:20 am - 5 pm    Clinic Phone: [phone]    The clinic lab opens at 7:30 am Mon - Fri and appointments are required.    [facility]. [phone]  Monday  8 am - 7pm  Tues - Fri 8 am - 5:30 pm

## 2018-06-22 NOTE — MR AVS SNAPSHOT
After Visit Summary   6/22/2018    Bella Fuentes    MRN: [id number]           Patient Information     Date Of Birth          1960        Visit Information        Provider Department      6/22/2018 7:40 AM Umu Albright MD [facility]        Today's Diagnoses     Hyperlipidemia LDL goal <160    -  1    Disturbance of skin sensation          Care Instructions          Thank you for choosing [facility].  You may be receiving a survey in the mail from [employer] regarding your visit today.  Please take a few minutes to complete and return the survey to let us know how we are doing.      Our Clinic hours are:  Mondays    7:20 am - 7 pm  Tues -  Fri  7:20 am - 5 pm    Clinic Phone: [phone]    The clinic lab opens at 7:30 am Mon - Fri and appointments are required.    [facility] [place]  Ph. [phone]  Monday  8 am - 7pm  Tues - Fri 8 am - 5:30 pm                 Follow-ups after your visit        Who to contact     If you have questions or need follow up information about today's clinic visit or your schedule please contact [facility] directly at [phone].  Normal or non-critical lab and imaging results will be communicated to you by Unigohart, letter or phone within 4 business days after the clinic has received the results. If you do not hear from us within 7 days, please contact the clinic through Unigohart or phone. If you have a critical or abnormal lab result, we will notify you by phone as soon as possible.  Submit refill requests through [employer] or call your pharmacy and they will forward the refill request to us. Please allow 3 business days for your refill to be completed.          Additional Information About Your Visit        MyChart Information     [employer] gives you secure access to your electronic health record. If you see a primary care provider, you can also send messages to your care team and make appointments. If you have  questions, please call your primary care clinic.  If you do not have a primary care provider, please call [phone] and they will assist you.        Care EveryWhere ID     This is your Care EveryWhere ID. This could be used by other organizations to access your [facility] medical records  [id number]        Your Vitals Were     Respirations Breastfeeding?                18 No           Blood Pressure from Last 3 Encounters:   06/22/18 102/74   05/01/17 118/76   04/28/17 144/87    Weight from Last 3 Encounters:   04/22/14 163 lb (73.9 kg)   04/02/14 163 lb (73.9 kg)   05/13/11 195 lb (88.5 kg)              We Performed the Following     Hemoglobin A1c     Lipid panel reflex to direct LDL Fasting     TSH with free T4 reflex     Vitamin B12        Primary Care Provider Office Phone # Fax #    Umu Albright [phone] [address] [facility] [address]        Equal Access to Services     MAIDA MONK : Hadii aad ku hadasho Soomaali, waaxda luqadaha, qaybta kaalmada adeegyada, waxay jefin haysandern willy sicnlair . So [facility] [phone].    ATENCIÓN: Si lyudmilala esppk, tiene a ernandez disposición servicios gratuitos de asistencia lingüística. Llame al [phone].    We comply with applicable federal civil rights laws and [place] laws. We do not discriminate on the basis of race, color, national origin, age, disability, sex, sexual orientation, or gender identity.            Thank you!     Thank you for choosing [facility]  for your care. Our goal is always to provide you with excellent care. Hearing back from our patients is one way we can continue to improve our services. Please take a few minutes to complete the written survey that you may receive in the mail after your visit with us. Thank you!             Your Updated Medication List - Protect others around you: Learn how to safely use, store and throw away your medicines at www.disposemymeds.org.          This  list is accurate as of 6/22/18  8:39 AM.  Always use your most recent med list.                   Brand Name Dispense Instructions for use Diagnosis    * EPINEPHrine 0.3 MG/0.3ML injection 2-pack    EPIPEN 2-TRISTAN    1 each    Inject 0.3 mLs (0.3 mg) into the muscle once as needed for anaphylaxis    Allergy to insects and arachnids       * EPINEPHrine 0.3 MG/0.3ML injection 2-pack    EPIPEN/ADRENACLICK/or ANY BX GENERIC EQUIV    0.6 mL    Inject 0.3 mLs (0.3 mg) into the muscle as needed for anaphylaxis (Needs follow-up appointment for this medication)    Allergic reaction       * Notice:  This list has 2 medication(s) that are the same as other medications prescribed for you. Read the directions carefully, and ask your doctor or other care provider to review them with you.

## 2019-05-14 DIAGNOSIS — T78.40XA ALLERGIC REACTION: ICD-10-CM

## 2019-05-14 NOTE — TELEPHONE ENCOUNTER
"Requested Prescriptions   Pending Prescriptions Disp Refills     EPINEPHrine (EPIPEN/ADRENACLICK/OR ANY BX GENERIC EQUIV) 0.3 MG/0.3ML injection 2-pack 0.6 mL 0     Sig: Inject 0.3 mLs (0.3 mg) into the muscle as needed for anaphylaxis (Needs follow-up appointment for this medication)       Anaphylaxis Kits Protocol Passed - 5/14/2019 12:01 PM        Passed - Recent (12 mo) or future (30 days) visit witin the authorizing provider's specialty     Patient had office visit in the last 12 months or has a visit in the next 30 days with authorizing provider or within the authorizing provider's specialty.  See \"Patient Info\" tab in inbasket, or \"Choose Columns\" in Meds & Orders section of the refill encounter.              Passed - Patient is age 5 or older        Passed - Medication is active on med list        Last Written Prescription Date:  05/07/2018  Last Fill Quantity: 0.6 ml,  # refills: 0   Last office visit: 6/22/2018 with prescribing provider:  Jose Ramon   Future Office Visit:      "

## 2019-05-15 RX ORDER — EPINEPHRINE 0.3 MG/.3ML
0.3 INJECTION SUBCUTANEOUS PRN
Qty: 0.6 ML | Refills: 1 | Status: SHIPPED | OUTPATIENT
Start: 2019-05-15 | End: 2021-04-20

## 2019-11-03 ENCOUNTER — HEALTH MAINTENANCE LETTER (OUTPATIENT)
Age: 59
End: 2019-11-03

## 2019-11-15 ENCOUNTER — APPOINTMENT (OUTPATIENT)
Dept: GENERAL RADIOLOGY | Facility: CLINIC | Age: 59
End: 2019-11-15
Attending: EMERGENCY MEDICINE
Payer: COMMERCIAL

## 2019-11-15 ENCOUNTER — HOSPITAL ENCOUNTER (EMERGENCY)
Facility: CLINIC | Age: 59
Discharge: HOME | End: 2019-11-15
Attending: EMERGENCY MEDICINE | Admitting: EMERGENCY MEDICINE
Payer: COMMERCIAL

## 2019-11-15 VITALS
DIASTOLIC BLOOD PRESSURE: 80 MMHG | OXYGEN SATURATION: 98 % | HEIGHT: 62 IN | TEMPERATURE: 98.8 F | BODY MASS INDEX: 34.04 KG/M2 | WEIGHT: 185 LBS | RESPIRATION RATE: 18 BRPM | SYSTOLIC BLOOD PRESSURE: 128 MMHG

## 2019-11-15 DIAGNOSIS — M25.462 EFFUSION OF LEFT KNEE: ICD-10-CM

## 2019-11-15 DIAGNOSIS — M25.562 ACUTE PAIN OF LEFT KNEE: ICD-10-CM

## 2019-11-15 PROCEDURE — 99214 OFFICE O/P EST MOD 30 MIN: CPT | Mod: Z6 | Performed by: EMERGENCY MEDICINE

## 2019-11-15 PROCEDURE — 25000132 ZZH RX MED GY IP 250 OP 250 PS 637: Performed by: EMERGENCY MEDICINE

## 2019-11-15 PROCEDURE — G0463 HOSPITAL OUTPT CLINIC VISIT: HCPCS

## 2019-11-15 PROCEDURE — 99283 EMERGENCY DEPT VISIT LOW MDM: CPT | Performed by: EMERGENCY MEDICINE

## 2019-11-15 PROCEDURE — 73560 X-RAY EXAM OF KNEE 1 OR 2: CPT | Mod: LT

## 2019-11-15 RX ORDER — IBUPROFEN 200 MG
800 TABLET ORAL EVERY 8 HOURS PRN
Qty: 30 TABLET | Refills: 0 | COMMUNITY
Start: 2019-11-15 | End: 2019-11-20

## 2019-11-15 RX ORDER — ACETAMINOPHEN 500 MG
1000 TABLET ORAL EVERY 8 HOURS PRN
Qty: 30 TABLET | Refills: 0 | COMMUNITY
Start: 2019-11-15 | End: 2019-11-20

## 2019-11-15 RX ORDER — IBUPROFEN 400 MG/1
800 TABLET, FILM COATED ORAL ONCE
Status: DISCONTINUED | OUTPATIENT
Start: 2019-11-15 | End: 2019-11-15 | Stop reason: HOSPADM

## 2019-11-15 ASSESSMENT — MIFFLIN-ST. JEOR: SCORE: 1362.4

## 2019-11-15 NOTE — ED AVS SNAPSHOT
[facility] Emergency Department  [address] [address]  Phone:  [phone]  Fax:  [phone]                                    Bella Fuentes   MRN: [id number]    Department:  [facility] Emergency Department   Date of Visit:  11/15/2019           After Visit Summary Signature Page    I have received my discharge instructions, and my questions have been answered. I have discussed any challenges I see with this plan with the nurse or doctor.    ..........................................................................................................................................  Patient/Patient Representative Signature      ..........................................................................................................................................  Patient Representative Print Name and Relationship to Patient    ..................................................               ................................................  Date                                   Time    ..........................................................................................................................................  Reviewed by Signature/Title    ...................................................              ..............................................  Date                                               Time          22EPIC Rev 08/18

## 2019-11-15 NOTE — LETTER
November 15, 2019      To Whom It May Concern:      Bella Fuentes was seen in our Emergency Department today, 11/15/19.  She is advised to sit while at at work with her leg elevated for the next 1 to 2 weeks or until her knee pain has improved.  She may benefit from crutches to help with mobility while she is having this pain.    Sincerely,        Roland Marx MD

## 2019-11-16 ASSESSMENT — ENCOUNTER SYMPTOMS
CHEST TIGHTNESS: 0
ABDOMINAL PAIN: 0
ARTHRALGIAS: 1
FEVER: 0
APPETITE CHANGE: 0
HEADACHES: 0
LIGHT-HEADEDNESS: 0

## 2019-11-16 NOTE — ED PROVIDER NOTES
History     Chief Complaint   Patient presents with     Leg Pain     left leg pain     HPI  Bella Fuentes is a 59 year old female with history of obesity and previous issues with her hips and knees presenting for evaluation of roughly 1 week of left knee pain.  Patient reports she woke from sleep last Saturday night and when she put her foot down to walk she had a acute excruciating sharp pain in her left knee.  She reports mostly posterior and lateral pain but also medial pain.  Since then she is had difficulty walking due to ongoing pain with weightbearing.  Reports no pain at rest when her knee is in a neutral position.  Pain is only present with flexion or extension of the or weightbearing.  Reports pain is isolated knee with no pain into her calf or lower leg.  No numbness, tingling, or weakness.  No upper leg pain.  Denies hip pain.  Denies fall or injury.  Reports pain feels similar to pain she had in her knee several years when she was evaluated previously for that.  Has not been taking anything for pain.    ==================================================================    CHART REVIEW:    Xray 3/17/15  Study Result     Moderate lateral compartment narrowing on the left, minimal medial compartment narrowing on the right. Mildly irregular appearance at the lateral aspect of the lateral tibial plateau on the left, which is more likely related to degenerative change, versus acute injury such as impaction fracture. No other acute findings noted.         END CHART REVIEW  ==================================================================          Allergies:  Allergies   Allergen Reactions     Bee      Prednisone Other (See Comments)     Makes me crazy, heightened awareness       Problem List:    Patient Active Problem List    Diagnosis Date Noted     Localized osteoarthrosis, lower leg 03/26/2015     Priority: Medium     Problem list name updated by automated process. Provider to review       Abnormal gait  2015     Priority: Medium     Pain in joint, pelvic region and thigh 2015     Priority: Medium     HYPERLIPIDEMIA LDL GOAL <160 10/31/2010     Priority: Medium     Need for prophylactic hormone replacement therapy (postmenopausal) 2006     Priority: Medium     Periods ended  at 42       Allergic reaction 2006     Priority: Medium        Past Medical History:    Past Medical History:   Diagnosis Date     Allergy to insects and arachnids        Past Surgical History:    Past Surgical History:   Procedure Laterality Date     COLONOSCOPY  2014    Procedure: COLONOSCOPY;  Surgeon: Navid Johns MD;  Location: [facility]       Family History:    Family History   Problem Relation Age of Onset     Heart Disease Father      Breast Cancer Paternal Grandmother      Cardiovascular Mother      Unknown/Adopted Mother        Social History:  Marital Status:   [2]  Social History     Tobacco Use     Smoking status: Current Some Day Smoker     Types: Cigarettes     Last attempt to quit: 2014     Years since quittin.4     Smokeless tobacco: Never Used     Tobacco comment: 4-5 ciggarettesper day    Substance Use Topics     Alcohol use: Yes     Comment: 1-2 per month     Drug use: No        Medications:    acetaminophen (TYLENOL) 500 MG tablet  ibuprofen (ADVIL/MOTRIN) 200 MG tablet  EPINEPHrine (EPIPEN/ADRENACLICK/OR ANY BX GENERIC EQUIV) 0.3 MG/0.3ML injection 2-pack          Review of Systems   Constitutional: Negative for appetite change and fever.   HENT: Negative for congestion.    Respiratory: Negative for chest tightness.    Cardiovascular: Negative for chest pain.   Gastrointestinal: Negative for abdominal pain.   Musculoskeletal: Positive for arthralgias (left knee pain) and gait problem.   Skin: Negative for rash.   Neurological: Negative for light-headedness and headaches.   All other systems reviewed and are negative.      Physical Exam   BP: 128/80  Heart Rate: 94  Temp:  "98.8  F (37.1  C)  Resp: 18  Height: 157.5 cm (5' 2\")  Weight: 83.9 kg (185 lb)  SpO2: 98 %      Physical Exam  Vitals signs and nursing note reviewed.   Constitutional:       Appearance: She is obese.   HENT:      Head: Normocephalic and atraumatic.   Neck:      Musculoskeletal: Normal range of motion.   Cardiovascular:      Rate and Rhythm: Normal rate.      Pulses: Normal pulses.   Pulmonary:      Effort: Pulmonary effort is normal.   Musculoskeletal:      Left knee: She exhibits decreased range of motion, swelling and effusion. She exhibits normal patellar mobility. No tenderness found. No patellar tendon tenderness noted.   Skin:     General: Skin is warm and dry.      Capillary Refill: Capillary refill takes less than 2 seconds.   Neurological:      Mental Status: She is alert and oriented to person, place, and time.   Psychiatric:         Behavior: Behavior is agitated.         ED Course        Procedures           Results for orders placed or performed during the hospital encounter of 11/15/19 (from the past 24 hour(s))   XR Knee Left 1/2 Views    Narrative    Examination:  XR KNEE LT 1/2 VW  Date:  11/15/2019 9:06 PM     Clinical Information: Left-sided knee pain.   Comparison: 3/17/2015.      Impression    Impression: Moderate left knee degenerative arthrosis with  tricompartmental osteophytosis. Moderate sized joint effusion. No  fracture or dislocation.    GHANSHYAM ELDER MD       Medications - No data to display    Assessments & Plan (with Medical Decision Making)  59-year-old obese female pending for evaluation of left knee pain.  Symptoms began about a week ago with no clear trauma.  Has had ongoing pain with movement and weightbearing ever since.  Minimal pain at rest.  No localizing tenderness on exam but does have pain with movement.  Symptoms suggestive of arthritis or localized effusion.  X-ray showed evidence of degenerative arthritis with associated moderate joint effusion without fracture.  " Recommend symptom medic treatment for her effusion with follow-up with orthopedics if need be for ongoing care      I have reviewed the nursing notes.    I have reviewed the findings, diagnosis, plan and need for follow up with the patient.       Discharge Medication List as of 11/15/2019  9:24 PM      START taking these medications    Details   acetaminophen (TYLENOL) 500 MG tablet Take 2 tablets (1,000 mg) by mouth every 8 hours as needed for fever or pain, Disp-30 tablet, R-0, OTC      ibuprofen (ADVIL/MOTRIN) 200 MG tablet Take 4 tablets (800 mg) by mouth every 8 hours as needed for mild pain, Disp-30 tablet, R-0, OTC             Final diagnoses:   Acute pain of left knee   Effusion of left knee       11/15/2019   [facility] EMERGENCY DEPARTMENT     Marx, Roland Juan MD  11/16/19 0113

## 2019-11-16 NOTE — ED NOTES
C/o left knee pain. States she has had trouble with her knees all her life but this pain is different and feels deep. Going on x 1 week. Went to work and was unable to walk on it. No injury.

## 2019-11-19 ENCOUNTER — TELEPHONE (OUTPATIENT)
Dept: FAMILY MEDICINE | Facility: CLINIC | Age: 59
End: 2019-11-19

## 2019-11-19 NOTE — TELEPHONE ENCOUNTER
"I called her back and advised she needs to be seen for this ,  her last office visit was June 2018.   She was very upset, and angry, \"you seem to just want my money, that is why I don't even come in for routine checks because it is $350 to walk in the door. \"    Attempted to advise  the reasons doctor needs to see her to eval/ review and advise to complete a handicap parking permit, as well as formulate a plan for her re: does she need to see Ortho, or PT, or what will be able to be done to help her with the knee, etc   She went on and on for 10 minutes about how upset she is about this, she \"saw a doctor in the ER, and he won't do it, they told me to call you, so I call and now you say I need to be seen. \"    Apologized and advised Dr Albright wouldn't know what to put on the form, as she has not seen her and doesn't know expected length and duration and plans at this point for her knee, hence the reason for ED follow up appointment.     She ultimately agreed to be seen and is scheduled with Gabrielle AMAYA on Monday, as Dr Albright had no openings.     Avelina Coello RNC    "

## 2019-11-19 NOTE — TELEPHONE ENCOUNTER
Reason for Call:  Other     Detailed comments: Patient went to ER Fri, 11/15/19 and was diagnosed with fluid on the knee. Now wants temporary handicapped sticker    Phone Number Patient can be reached at: Home number on file [phone] (home)    Best Time: Any    Can we leave a detailed message on this number? YES    Call taken on 11/19/2019 at 1:05 PM by Liz Boyce

## 2020-02-24 LAB
HPV ABSTRACT: NORMAL
PAP-ABSTRACT: NORMAL

## 2020-03-12 ENCOUNTER — TRANSFERRED RECORDS (OUTPATIENT)
Dept: HEALTH INFORMATION MANAGEMENT | Facility: CLINIC | Age: 60
End: 2020-03-12

## 2020-11-16 ENCOUNTER — HEALTH MAINTENANCE LETTER (OUTPATIENT)
Age: 60
End: 2020-11-16

## 2021-04-12 DIAGNOSIS — T78.40XA ALLERGIC REACTION: ICD-10-CM

## 2021-04-13 RX ORDER — EPINEPHRINE 0.3 MG/.3ML
INJECTION SUBCUTANEOUS
Refills: 1 | OUTPATIENT
Start: 2021-04-13

## 2021-04-16 ENCOUNTER — TELEPHONE (OUTPATIENT)
Dept: FAMILY MEDICINE | Facility: CLINIC | Age: 61
End: 2021-04-16

## 2021-04-16 DIAGNOSIS — T78.40XA ALLERGIC REACTION: ICD-10-CM

## 2021-04-19 RX ORDER — EPINEPHRINE 0.3 MG/.3ML
INJECTION SUBCUTANEOUS
Refills: 1 | OUTPATIENT
Start: 2021-04-19

## 2021-04-19 NOTE — TELEPHONE ENCOUNTER
"Requested Prescriptions   Pending Prescriptions Disp Refills     EPINEPHrine (ANY BX GENERIC EQUIV) 0.3 MG/0.3ML injection 2-pack [Pharmacy Med Name: EPINEPHRINE 0.3 MG AUTO-INJECT]  1     Sig: INJECT 0.3 MLS (0.3 MG) INTO THE MUSCLE AS NEEDED FOR ANAPHYLAXIS (NEEDS APPT. FOR THIS MEDICATION)       Anaphylaxis Kits Protocol Failed - 4/16/2021  9:51 AM        Failed - Recent (12 mo) or future (30 days) visit witin the authorizing provider's specialty     Patient has had an office visit with the authorizing provider or a provider within the authorizing providers department within the previous 12 mos or has a future within next 30 days. See \"Patient Info\" tab in inbasket, or \"Choose Columns\" in Meds & Orders section of the refill encounter.              Passed - Patient is age 5 or older        Passed - Medication is active on med list             "

## 2021-04-20 ENCOUNTER — OFFICE VISIT (OUTPATIENT)
Dept: FAMILY MEDICINE | Facility: CLINIC | Age: 61
End: 2021-04-20
Payer: COMMERCIAL

## 2021-04-20 VITALS
DIASTOLIC BLOOD PRESSURE: 86 MMHG | OXYGEN SATURATION: 95 % | SYSTOLIC BLOOD PRESSURE: 120 MMHG | RESPIRATION RATE: 16 BRPM | TEMPERATURE: 98.4 F | HEART RATE: 73 BPM

## 2021-04-20 DIAGNOSIS — T63.441A ALLERGIC REACTION TO BEE STING: Primary | ICD-10-CM

## 2021-04-20 PROCEDURE — 99213 OFFICE O/P EST LOW 20 MIN: CPT | Performed by: NURSE PRACTITIONER

## 2021-04-20 RX ORDER — EPINEPHRINE 0.3 MG/.3ML
0.3 INJECTION SUBCUTANEOUS PRN
Qty: 0.6 ML | Refills: 1 | Status: SHIPPED | OUTPATIENT
Start: 2021-04-20

## 2021-04-20 NOTE — Clinical Note
Other Tests found in the patient's chart through Chart Review/Care Everywhere:    Mammogram done by this group [facility] on this date: 3/12/2020, Pap smear done by this group [facility] this date: 2/24/2020 and HPV done by this group [facility] on this date: 2/24/2020

## 2021-04-20 NOTE — PROGRESS NOTES
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    YOANA Farias CNP  [facility]    Altagracia Blanco is a 61 year old who presents for the following health issues     HPI     Medication Followup of Epi Pen     Taking Medication as prescribed: yes- as needed. Has not actually taken a dose ever, has on hand for hx of severe reactions. None have been anaphylactic.    Side Effects:  n/a    Medication Helping Symptoms:  n/a     Please abstract the following data from this visit with this patient into the appropriate field in Epic:    Other Tests found in the patient's chart through Chart Review/Care Everywhere:    Mammogram done by this group [facility] on this date: 3/12/2020, Pap smear done by this group [facility] this date: 2/24/2020 and HPV done by this group [facility] on this date: 2/24/2020      Review of Systems   Constitutional, HEENT, cardiovascular, pulmonary, gi and gu systems are negative, except as otherwise noted.      Objective    /86   Pulse 73   Temp 98.4  F (36.9  C) (Tympanic)   Resp 16   SpO2 95%   There is no height or weight on file to calculate BMI.  Physical Exam   GENERAL: healthy, alert and no distress  CV: regular rates and rhythm  NEURO: Normal strength and tone, mentation intact and speech normal

## 2021-05-06 ENCOUNTER — TELEPHONE (OUTPATIENT)
Dept: FAMILY MEDICINE | Facility: CLINIC | Age: 61
End: 2021-05-06

## 2021-05-06 NOTE — TELEPHONE ENCOUNTER
Reason for Call:  Wrong Coding     Detailed comments: Pt said she was  in for a Physical 4/20/21 due to getting a Epi Pen coding pt now has a Bill Balance she has to pay.   Pt said her Insurance pays for a Physical. The OV was coded  Monitoring a Condition which now her she has to pay 177.83 due to it being coded for for this Epi Pen/ Allergic reaction instead of a Physical.     Pt is requesting Clarification with provider. Pt has already called the Pt relations staff already.     Phone Number Patient can be reached at: Home number on file [phone] (home)    Best Time: Any Time      Can we leave a detailed message on this number? YES    Call taken on 5/6/2021 at 11:25 AM by Marietta Green

## 2021-05-06 NOTE — TELEPHONE ENCOUNTER
Patient very upset about billing for 4/20 visit.  She states that she had a physical and it should be free.  Advised patient that the appointment was made for med refills and was not scheduled as a physical nor was a physical performed. Patient disagrees and states that provider checked her blood pressure and looked in her ears.   Advised patient that providers will do vitals and look in ears at every office visit.  Patient continues to be upset, states she will never come to [place] again.  Apologized to patient about the miscommunication re: appointment for physical vs regular office visit but explained split billing rule and she would still be billed a portion of the visit for the epinephrine pen refill.    Sharon Lay RN

## 2021-09-18 ENCOUNTER — HEALTH MAINTENANCE LETTER (OUTPATIENT)
Age: 61
End: 2021-09-18

## 2022-01-08 ENCOUNTER — HEALTH MAINTENANCE LETTER (OUTPATIENT)
Age: 62
End: 2022-01-08

## 2022-11-19 ENCOUNTER — HEALTH MAINTENANCE LETTER (OUTPATIENT)
Age: 62
End: 2022-11-19

## 2023-06-01 ENCOUNTER — HEALTH MAINTENANCE LETTER (OUTPATIENT)
Age: 63
End: 2023-06-01